# Patient Record
Sex: FEMALE | Race: WHITE | URBAN - METROPOLITAN AREA
[De-identification: names, ages, dates, MRNs, and addresses within clinical notes are randomized per-mention and may not be internally consistent; named-entity substitution may affect disease eponyms.]

---

## 2020-12-02 ENCOUNTER — APPOINTMENT (OUTPATIENT)
Dept: CT IMAGING | Age: 50
End: 2020-12-02

## 2020-12-02 ENCOUNTER — HOSPITAL ENCOUNTER (EMERGENCY)
Age: 50
Discharge: HOME | End: 2020-12-02
Attending: EMERGENCY MEDICINE

## 2020-12-02 VITALS
SYSTOLIC BLOOD PRESSURE: 112 MMHG | RESPIRATION RATE: 17 BRPM | OXYGEN SATURATION: 100 % | BODY MASS INDEX: 28.17 KG/M2 | HEIGHT: 66 IN | TEMPERATURE: 99.1 F | HEART RATE: 88 BPM | WEIGHT: 175.27 LBS | DIASTOLIC BLOOD PRESSURE: 70 MMHG

## 2020-12-02 LAB
A/G RATIO: 1.5 (ref 1.1–2.2)
ALBUMIN SERPL-MCNC: 4.2 G/DL (ref 3.4–5)
ALP BLD-CCNC: 59 U/L (ref 40–129)
ALT SERPL-CCNC: 30 U/L (ref 10–40)
ANION GAP SERPL CALCULATED.3IONS-SCNC: 8 MMOL/L (ref 3–16)
ANISOCYTOSIS: ABNORMAL
AST SERPL-CCNC: 16 U/L (ref 15–37)
BASOPHILS ABSOLUTE: 0 K/UL (ref 0–0.2)
BASOPHILS RELATIVE PERCENT: 0.2 %
BILIRUB SERPL-MCNC: 0.7 MG/DL (ref 0–1)
BILIRUBIN URINE: NEGATIVE
BLOOD, URINE: NEGATIVE
BUN BLDV-MCNC: 14 MG/DL (ref 7–20)
C-REACTIVE PROTEIN: 2.3 MG/L (ref 0–5.1)
CALCIUM SERPL-MCNC: 9 MG/DL (ref 8.3–10.6)
CHLORIDE BLD-SCNC: 104 MMOL/L (ref 99–110)
CLARITY: CLEAR
CO2: 27 MMOL/L (ref 21–32)
COLOR: YELLOW
CREAT SERPL-MCNC: 0.5 MG/DL (ref 0.6–1.1)
EOSINOPHILS ABSOLUTE: 0.2 K/UL (ref 0–0.6)
EOSINOPHILS RELATIVE PERCENT: 1.6 %
GFR AFRICAN AMERICAN: >60
GFR NON-AFRICAN AMERICAN: >60
GLOBULIN: 2.8 G/DL
GLUCOSE BLD-MCNC: 99 MG/DL (ref 70–99)
GLUCOSE URINE: NEGATIVE MG/DL
HCG QUALITATIVE: NEGATIVE
HCT VFR BLD CALC: 34.3 % (ref 36–48)
HEMATOLOGY PATH CONSULT: YES
HEMOGLOBIN: 10.8 G/DL (ref 12–16)
HYPOCHROMIA: ABNORMAL
KETONES, URINE: NEGATIVE MG/DL
LEUKOCYTE ESTERASE, URINE: NEGATIVE
LYMPHOCYTES ABSOLUTE: 2.5 K/UL (ref 1–5.1)
LYMPHOCYTES RELATIVE PERCENT: 25.1 %
MCH RBC QN AUTO: 19.2 PG (ref 26–34)
MCHC RBC AUTO-ENTMCNC: 31.6 G/DL (ref 31–36)
MCV RBC AUTO: 60.6 FL (ref 80–100)
MICROCYTES: ABNORMAL
MICROSCOPIC EXAMINATION: NORMAL
MONOCYTES ABSOLUTE: 0.7 K/UL (ref 0–1.3)
MONOCYTES RELATIVE PERCENT: 6.8 %
NEUTROPHILS ABSOLUTE: 6.6 K/UL (ref 1.7–7.7)
NEUTROPHILS RELATIVE PERCENT: 66.3 %
NITRITE, URINE: NEGATIVE
OVALOCYTES: ABNORMAL
PDW BLD-RTO: 15.5 % (ref 12.4–15.4)
PH UA: 7.5 (ref 5–8)
PLATELET # BLD: 303 K/UL (ref 135–450)
PMV BLD AUTO: 8.6 FL (ref 5–10.5)
POLYCHROMASIA: ABNORMAL
POTASSIUM REFLEX MAGNESIUM: 3.9 MMOL/L (ref 3.5–5.1)
PROTEIN UA: NEGATIVE MG/DL
RBC # BLD: 5.66 M/UL (ref 4–5.2)
SARS-COV-2, NAAT: NOT DETECTED
SCHISTOCYTES: ABNORMAL
SEDIMENTATION RATE, ERYTHROCYTE: 4 MM/HR (ref 0–20)
SODIUM BLD-SCNC: 139 MMOL/L (ref 136–145)
SPECIFIC GRAVITY UA: <1.005 (ref 1–1.03)
TARGET CELLS: ABNORMAL
TEAR DROP CELLS: ABNORMAL
TOTAL CK: 53 U/L (ref 26–192)
TOTAL PROTEIN: 7 G/DL (ref 6.4–8.2)
TROPONIN: <0.01 NG/ML
TSH REFLEX: 1.25 UIU/ML (ref 0.27–4.2)
URINE REFLEX TO CULTURE: NORMAL
URINE TYPE: NORMAL
UROBILINOGEN, URINE: 0.2 E.U./DL
WBC # BLD: 10 K/UL (ref 4–11)

## 2020-12-02 PROCEDURE — U0003 INFECTIOUS AGENT DETECTION BY NUCLEIC ACID (DNA OR RNA); SEVERE ACUTE RESPIRATORY SYNDROME CORONAVIRUS 2 (SARS-COV-2) (CORONAVIRUS DISEASE [COVID-19]), AMPLIFIED PROBE TECHNIQUE, MAKING USE OF HIGH THROUGHPUT TECHNOLOGIES AS DESCRIBED BY CMS-2020-01-R: HCPCS

## 2020-12-02 PROCEDURE — 85652 RBC SED RATE AUTOMATED: CPT

## 2020-12-02 PROCEDURE — 6360000002 HC RX W HCPCS: Performed by: EMERGENCY MEDICINE

## 2020-12-02 PROCEDURE — 93005 ELECTROCARDIOGRAM TRACING: CPT | Performed by: EMERGENCY MEDICINE

## 2020-12-02 PROCEDURE — 84443 ASSAY THYROID STIM HORMONE: CPT

## 2020-12-02 PROCEDURE — 85025 COMPLETE CBC W/AUTO DIFF WBC: CPT

## 2020-12-02 PROCEDURE — 70498 CT ANGIOGRAPHY NECK: CPT

## 2020-12-02 PROCEDURE — 6360000004 HC RX CONTRAST MEDICATION: Performed by: EMERGENCY MEDICINE

## 2020-12-02 PROCEDURE — 2580000003 HC RX 258: Performed by: EMERGENCY MEDICINE

## 2020-12-02 PROCEDURE — 82550 ASSAY OF CK (CPK): CPT

## 2020-12-02 PROCEDURE — 80053 COMPREHEN METABOLIC PANEL: CPT

## 2020-12-02 PROCEDURE — 84703 CHORIONIC GONADOTROPIN ASSAY: CPT

## 2020-12-02 PROCEDURE — 84484 ASSAY OF TROPONIN QUANT: CPT

## 2020-12-02 PROCEDURE — 86140 C-REACTIVE PROTEIN: CPT

## 2020-12-02 PROCEDURE — U0002 COVID-19 LAB TEST NON-CDC: HCPCS

## 2020-12-02 PROCEDURE — 81003 URINALYSIS AUTO W/O SCOPE: CPT

## 2020-12-02 PROCEDURE — 99284 EMERGENCY DEPT VISIT MOD MDM: CPT

## 2020-12-02 PROCEDURE — 96374 THER/PROPH/DIAG INJ IV PUSH: CPT

## 2020-12-02 PROCEDURE — 70450 CT HEAD/BRAIN W/O DYE: CPT

## 2020-12-02 PROCEDURE — 6370000000 HC RX 637 (ALT 250 FOR IP): Performed by: EMERGENCY MEDICINE

## 2020-12-02 RX ORDER — MECLIZINE HCL 12.5 MG/1
12.5 TABLET ORAL 3 TIMES DAILY PRN
Qty: 15 TABLET | Refills: 0 | Status: SHIPPED | OUTPATIENT
Start: 2020-12-02 | End: 2020-12-12

## 2020-12-02 RX ORDER — PROCHLORPERAZINE EDISYLATE 5 MG/ML
10 INJECTION INTRAMUSCULAR; INTRAVENOUS ONCE
Status: COMPLETED | OUTPATIENT
Start: 2020-12-02 | End: 2020-12-02

## 2020-12-02 RX ORDER — MECLIZINE HCL 12.5 MG/1
12.5 TABLET ORAL ONCE
Status: COMPLETED | OUTPATIENT
Start: 2020-12-02 | End: 2020-12-02

## 2020-12-02 RX ORDER — 0.9 % SODIUM CHLORIDE 0.9 %
1000 INTRAVENOUS SOLUTION INTRAVENOUS ONCE
Status: COMPLETED | OUTPATIENT
Start: 2020-12-02 | End: 2020-12-02

## 2020-12-02 RX ORDER — PROCHLORPERAZINE MALEATE 10 MG
10 TABLET ORAL EVERY 6 HOURS PRN
Qty: 120 TABLET | Refills: 0 | Status: SHIPPED | OUTPATIENT
Start: 2020-12-02

## 2020-12-02 RX ADMIN — PROCHLORPERAZINE EDISYLATE 10 MG: 5 INJECTION INTRAMUSCULAR; INTRAVENOUS at 16:46

## 2020-12-02 RX ADMIN — SODIUM CHLORIDE 1000 ML: 9 INJECTION, SOLUTION INTRAVENOUS at 15:09

## 2020-12-02 RX ADMIN — MECLIZINE 12.5 MG: 12.5 TABLET ORAL at 15:09

## 2020-12-02 RX ADMIN — IOPAMIDOL 75 ML: 755 INJECTION, SOLUTION INTRAVENOUS at 16:21

## 2020-12-02 ASSESSMENT — PAIN DESCRIPTION - DESCRIPTORS: DESCRIPTORS: DISCOMFORT

## 2020-12-02 ASSESSMENT — PAIN DESCRIPTION - PAIN TYPE: TYPE: ACUTE PAIN

## 2020-12-02 ASSESSMENT — PAIN DESCRIPTION - LOCATION: LOCATION: GENERALIZED

## 2020-12-02 ASSESSMENT — PAIN SCALES - GENERAL
PAINLEVEL_OUTOF10: 2
PAINLEVEL_OUTOF10: 4

## 2020-12-02 NOTE — ED NOTES
Bed: E-42  Expected date:   Expected time:   Means of arrival: [facility]  Comments:     Lukas Rosenberg RN  12/02/20 4001

## 2020-12-02 NOTE — ED PROVIDER NOTES
ED Attending Attestation Note     Date of evaluation: 12/2/2020    This patient was seen by the advance practice provider. I have seen and examined the patient, agree with the workup, evaluation, management and diagnosis. The care plan has been discussed. 50yo female who presents to the ED secondary to concern for headaches and dizziness that have been going on for about a week. Provoked by movement and worse at night. No other associated symptoms. Saw her PCP and was prescribed prednisone  (unclear). Completed course and thought it helped a little but did not go away. As of last night and today the symptoms have returned with new symptoms of pain to light touch throughout her chest/back/arms. Also feels weakness throughout her body. At school today she thought she might pass out a few times. Medical history is notable for PCOS. She also reports a history of diverticulitis. Otherwise states she is generally healthy. On my assessment she is awake, alert, oriented. Her vitals are hemodynamically stable. She does have a mildly elevated temperature of 99.1. Her neurologic exam is nonfocal.  Her pupils are equal, round, reactive to light. Extraocular muscles are intact. Visual fields are grossly full. Facial expression and sensation are symmetric. Hearing is symmetric. Tongue and uvula are midline. Her strength and sensation are 5 out of 5 and intact in bilateral upper and lower extremities. She is alert and oriented x4. She was endorsing a headache at that time and was ordered Compazine. Labs returned notable for anemia which she reports she has a history of though she was not aware of prior CBC differential showing polychromasia, schistocytes, target cells, teardrop cells etc.  Her labs are otherwise unremarkable. CT scans of her head and CT angios of her head and neck were done and revealed no acute abnormalities or findings.   She did have a CT head done in December 2013 and there is no Dispense:  15 tablet     Refill:  0     FINAL IMPRESSION      1. Dizziness    2. Muscle weakness    3. Anemia, unspecified type    4. Abnormal CBC        DISPOSITION/PLAN   DISPOSITION  12/02/2020 06:19:10 PM      PATIENT REFERRED TO:  Ursula Engel MD  [address] [facility] [address] [address]  [phone]    Schedule an appointment as soon as possible for a visit   For hematology follow-up care    Twinisaias Willsonmarybeth.  [facility] [address]  [address] [facility]  [phone]    Schedule an appointment as soon as possible for a visit   For follow up appointment, As needed    [address] [address]  [phone]  Schedule an appointment as soon as possible for a visit   For follow up appointment      DISCHARGE MEDICATIONS:  New Prescriptions    MECLIZINE (ANTIVERT) 12.5 MG TABLET    Take 1 tablet by mouth 3 times daily as needed for Dizziness or Nausea    PROCHLORPERAZINE (COMPAZINE) 10 MG TABLET    Take 1 tablet by mouth every 6 hours as needed (headache, nausea)            (Please note that portions of this note were completed with a voice recognition program. Efforts were made to edit the dictations but occasionally words are mis-transcribed.)    Joseph Ibrahim MD (electronically signed)  Attending Emergency Physician        Joseph Ibrahim MD  12/02/20 0633

## 2020-12-02 NOTE — ED PROVIDER NOTES
[address]      Pt Name: Joseph Montgomery  MRN: [id number]  [place] 1970  Date of evaluation: 12/2/2020  Provider: DIDIER Iverson Ma    Shared Visit or Autonomous Visit:  I have seen and evaluated this patient with my supervising physician Elli Woods MD.      [address] [place] [address]       Chief Complaint   Patient presents with    Fatigue     states decrease muscle tone starting today after lunch. had blood drawn last week-given meds for vertigo. hx of anemia,       HISTORY OF PRESENT ILLNESS  (Location/Symptom, Timing/Onset, Context/Setting, Quality, Duration, Modifying Factors, Severity.)   Joseph Montgomery is a 52 y.o. female who presents to the emergency department with complaints of positional dizziness, headaches, muscle weakness in the extremities, fatigue. Patient says that for couple of weeks she has been feeling what seems something like vertigo, where she gets dizzy easily, feels like she is going to fall over, and has accompanying headaches. She denies prior history of symptoms like this. She says she saw primary care and was prescribed a course of prednisone, which helped slightly, but symptoms never went away. She says that last night she began to feel notably worse, with a worsening of the prior symptoms and now also sensation of pain and burning to the touch throughout her chest and back, along with diffuse muscle weakness. She says it was as though when she put a coat on her when anyone touched her torso, she felt a sharp and burning pain, but not pain otherwise. She also says that several times since then she has felt like she was going to fall when she got up and walked around because she has such weakness and her legs and arms. Denies any actual loss of consciousness. Denies chest pain or shortness of breath. Denies fever or cold symptoms. 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   Reports that she still has regular menstrual periods. Denies any other relevant medical problems. No other complaints. Nursing Notes were reviewed and I agree. REVIEW OF SYSTEMS    (2-9 systems for level 4, 10 or more for level 5)     Constitutional: Positive for fatigue. Negative for fever, chills, and unexpected weight change. HENT:  Negative for congestion, ear pain, facial swelling, rhinorrhea, sneezing, sore throat and trouble swallowing. Eyes:  Negative for photophobia, pain and visual disturbance. Respiratory:  Negative for cough, shortness of breath, wheezing and stridor. Cardiovascular: Positive for episodic lightheadedness. Negative for chest pain, palpitations and leg swelling. Gastrointestinal:  Negative for nausea, vomiting, abdominal pain, diarrhea, constipation and blood in stool. Genitourinary:  Negative for dysuria, urgency, hematuria, flank pain, vaginal bleeding, vaginal discharge and pelvic pain. Musculoskeletal: Positive for symmetric muscle weakness in all extremities. Negative for arthralgias, neck pain and neck stiffness. Neurological: Positive for dizziness, headaches. Negative for seizures, syncope, speech difficulty, focal weakness, numbness. Psychiatric/Behavioral:  Negative for suicidal ideas, hallucinations, confusion. Except as noted above the remainder of the review of systems was reviewed and negative. PAST MEDICAL HISTORY         Diagnosis Date    Polycystic ovarian syndrome        SURGICAL HISTORY     History reviewed. No pertinent surgical history. CURRENT MEDICATIONS       Discharge Medication List as of 12/2/2020  6:23 PM      CONTINUE these medications which have NOT CHANGED    Details   naproxen (NAPROSYN) 500 MG tablet Take 1 tablet by mouth 2 times daily for 20 doses. , Disp-20 tablet,R-0Print             ALLERGIES     Patient has no known allergies.     FAMILY HISTORY           Problem Relation Age of Onset    Asthma Mother    Zannie Cowden Other Father      Family Status   Relation Name Status    Mother  Alive    Father  Alive        SOCIAL HISTORY      reports that she has never smoked. She has never used smokeless tobacco. She reports current alcohol use. She reports that she does not use drugs. PHYSICAL EXAM    (up to 7 for level 4, 8 or more for level 5)     ED Triage Vitals [12/02/20 1403]   BP Temp Temp Source Pulse Resp SpO2 Height Weight   (!) 126/90 99.1 °F (37.3 °C) Oral 87 16 100 % 5' 6\" (1.676 m) 175 lb 4.3 oz (79.5 kg)       Constitutional:  Appearing well-developed and well-nourished. No distress. HENT:  Normocephalic and atraumatic. Conjunctivae and EOM are normal. Pupils are equal, round, and reactive to light. Neck:  Normal range of motion. Neck supple. No tracheal deviation present. No thyromegaly present. No cervical adenopathy. Cardiovascular:  Normal rate, regular rhythm, normal heart sounds and intact distal pulses. Pulmonary/Chest:  Effort normal and breath sounds normal. No respiratory distress. No wheezes or rales. Abdominal:  Soft. Bowel sounds are normal. No distension, mass, tenderness, rebound or guarding. Musculoskeletal:  Normal range of motion. No edema exhibited. Neurological:  Alert and oriented to person, place, and time. PERRLA. EOM normal bilaterally. Cranial facial musculature and sensation grossly intact. Finger to nose testing intact. Negative pronator drift. 5/5 strength bilaterally in upper extremities with resisted shoulder abduction and flexion. Positive Romberg, with patient losing balance after a few seconds of standing. Normal gait. Skin:  Skin is warm and dry. Not diaphoretic. Psychiatric:  Normal mood, affect, behavior, judgment and thought content. DIAGNOSTIC RESULTS     RADIOLOGY:     Interpretation per the Radiologist below, if available at the time of this note:    CT Head WO Contrast   Final Result   No acute intracranial abnormality.       No change from prior study         [facility]  [address] [place] [address]   Phone [phone]   [place]    Narrative:     Performed at:  [facility]  [address] [place] [address]   Phone [phone]    Narrative:     Performed at:  [facility]  [address] [place] [address]   Phone [phone]       All other labs were within normal range or not returned as of this dictation. EMERGENCY DEPARTMENT COURSE and DIFFERENTIAL DIAGNOSIS/MDM:   Vitals:    Vitals:    12/02/20 1545 12/02/20 1630 12/02/20 1645 12/02/20 1815   BP: 116/76 114/71 116/76 112/70   Pulse: 82 75 78 88   Resp: 20 18 20 17   Temp:       TempSrc:       SpO2: 100% 100% 100% 100%   Weight:       Height:           The patient's condition in the ED was good, the patient was afebrile and nontoxic in appearance, and the patient's physical exam was unremarkable. Patient had a positive Romberg test, was not able to stand unassisted without getting dizzy, but otherwise no neurological deficits on exam.  Vital signs were normal.  CBC was notable for multiple abnormalities, with an H&H of 10.8/34. 3. Rest of laboratory work-up was reassuring, with a negative rapid COVID-19 test, normal CK and TSH, negative troponin, no UTI. At this point I turned over care of the patient entirely to Dr. Shantel Bhakta for further care and disposition. PROCEDURES:  None    FINAL IMPRESSION      1. Dizziness    2. Muscle weakness    3. Anemia, unspecified type    4. Abnormal CBC          DISPOSITION/PLAN   DISPOSITION Decision To Discharge 12/02/2020 06:19:10 PM      PATIENT REFERRED TO:  Esvin Pearce MD  [address] [place] [address]  [phone]    Schedule an appointment as soon as possible for a visit   For hematology follow-up care    Pasquale Hobbs.  [place] [facility] [address]  [address] [facility]  [phone]    Schedule an appointment as soon as possible for a visit   For follow up appointment, As needed    [address] [facility] [phone]  Schedule an appointment as soon as possible for a visit   For follow up appointment      DISCHARGE MEDICATIONS:  Discharge Medication List as of 12/2/2020  6:23 PM      START taking these medications    Details   prochlorperazine (COMPAZINE) 10 MG tablet Take 1 tablet by mouth every 6 hours as needed (headache, nausea), Disp-120 tablet,R-0Print      meclizine (ANTIVERT) 12.5 MG tablet Take 1 tablet by mouth 3 times daily as needed for Dizziness or Nausea, Disp-15 tablet,R-0Print             (Please note that portions of this note were completed with a voice recognition program.  Efforts were made to edit the dictations but occasionally words are mis-transcribed.)    Cole Robison, [address] [place] [address]  12/03/20 7072

## 2020-12-02 NOTE — LETTER
December 4, 2020    [facility] [address]      Dear Ms. Shawza Rafaela,    During your Emergency Department visit on 12/2/2020, radiology and/or lab tests were taken and sent for analysis. The Emergency Department physician has reviewed the results and would like to discuss the findings with you. As of this time, attempts to reach you have been unsuccessful. Please contact your Primary care Doctor for additional follow up.   Sincerely,     Dr. Shyanne OSCAR [phone]

## 2020-12-02 NOTE — ED TRIAGE NOTES
Patient to ED via squad states decrease muscle tone starting today after lunch. Reports muschle pain. Patient did have blood drawn last week-given meds for vertigo.  hx of anemia,

## 2020-12-03 ENCOUNTER — CARE COORDINATION (OUTPATIENT)
Dept: CARE COORDINATION | Age: 50
End: 2020-12-03

## 2020-12-03 LAB
EKG ATRIAL RATE: 84 BPM
EKG DIAGNOSIS: NORMAL
EKG P AXIS: 58 DEGREES
EKG P-R INTERVAL: 134 MS
EKG Q-T INTERVAL: 368 MS
EKG QRS DURATION: 80 MS
EKG QTC CALCULATION (BAZETT): 434 MS
EKG R AXIS: 52 DEGREES
EKG T AXIS: 51 DEGREES
EKG VENTRICULAR RATE: 84 BPM
HEMATOLOGY PATH CONSULT: NORMAL
SARS-COV-2, PCR: NOT DETECTED

## 2020-12-03 PROCEDURE — 93010 ELECTROCARDIOGRAM REPORT: CPT | Performed by: INTERNAL MEDICINE

## 2020-12-03 NOTE — CARE COORDINATION
Patient contacted regarding recent visit for viral symptoms. This Tosha Templetoneladia contacted the patient by telephone to perform post discharge call. Verified name and  with family as identifiers. Provided introduction to self, and reason for call due to viral symptoms of infection and/or exposure to COVID-19. Call within 2 business days of discharge: Yes     1 Spoke to spouse, Flavio Araujo who reports that pt is doing [place]. \" Spouse reports that pt is doing about the same. Spouse reports that pt has filled RX's and has been taking them. Spouse reports that PP os Dr. Daren Conde and has been in contact with office and is waiting to hear back from Oncologist, Dr. Ceci Estrada regarding follow up. Spouse declined [id number] resource. Spouse declined follow up call next week for symptom recheck, but did thank author for calling to follow up. Patient presented to emergency department/flu clinic with complaints of viral symptoms/exposure to COVID. Patient reports symptoms are the same. Due to no new or worsening symptoms the RN CTN/ACM was not notified for escalation. Discussed exposure protocols and quarantine with CDC Guidelines What To Do If You Are Sick    Family was given an opportunity for questions and concerns. Stay home except to get medical care    Separate yourself from other people and animals in your home    Call ahead before visiting your doctor    Wear a facemask    Cover your coughs and sneezes    Clean your hands often    Avoid sharing personal household items    Clean all high-touch surfaces everyday    Monitor your symptoms  Seek prompt medical attention if your illness is worsening (e.g., difficulty breathing). Before seeking care, call your healthcare provider and tell them that you have, or are being evaluated for, COVID-19. Put on a facemask before you enter the facility.  These steps will help the healthcare provider's office to keep other people in the office or waiting room from getting infected

## 2020-12-03 NOTE — RESULT ENCOUNTER NOTE
Discussed anemia with patient when she was in the emergency department. Please call and let her know pathology reviewed the blood smears and recommend following up as her RBC indices raise possibility of a hemoglobin disorder, including thalassemia. They also recommended correlation with iron studies. She was given hematology follow-up in her discharge paperwork, please encourage her to make sure she follows up with them.